# Patient Record
Sex: FEMALE | Race: WHITE | URBAN - METROPOLITAN AREA
[De-identification: names, ages, dates, MRNs, and addresses within clinical notes are randomized per-mention and may not be internally consistent; named-entity substitution may affect disease eponyms.]

---

## 2020-06-18 ENCOUNTER — OFFICE VISIT (OUTPATIENT)
Dept: URBAN - METROPOLITAN AREA CLINIC 22 | Facility: CLINIC | Age: 50
End: 2020-06-18
Payer: COMMERCIAL

## 2020-06-18 DIAGNOSIS — H52.13 MYOPIA, BILATERAL: Primary | ICD-10-CM

## 2020-06-18 DIAGNOSIS — H52.223 REGULAR ASTIGMATISM, BILATERAL: ICD-10-CM

## 2020-06-18 PROCEDURE — 92004 COMPRE OPH EXAM NEW PT 1/>: CPT | Performed by: OPTOMETRIST

## 2020-06-18 PROCEDURE — 92015 DETERMINE REFRACTIVE STATE: CPT | Performed by: OPTOMETRIST

## 2020-06-18 ASSESSMENT — VISUAL ACUITY
OD: 20/20
OS: 20/20

## 2020-06-18 ASSESSMENT — INTRAOCULAR PRESSURE
OD: 16
OS: 18

## 2020-06-18 NOTE — IMPRESSION/PLAN
Impression: Regular astigmatism, bilateral: H52.223. OU. Plan: Rx new prescription, fill as directed.

## 2022-12-30 ENCOUNTER — OFFICE VISIT (OUTPATIENT)
Dept: URBAN - METROPOLITAN AREA CLINIC 22 | Facility: CLINIC | Age: 52
End: 2022-12-30
Payer: COMMERCIAL

## 2022-12-30 DIAGNOSIS — H52.223 REGULAR ASTIGMATISM, BILATERAL: Primary | ICD-10-CM

## 2022-12-30 PROCEDURE — 92014 COMPRE OPH EXAM EST PT 1/>: CPT | Performed by: STUDENT IN AN ORGANIZED HEALTH CARE EDUCATION/TRAINING PROGRAM

## 2022-12-30 ASSESSMENT — VISUAL ACUITY
OS: 20/20
OD: 20/20

## 2022-12-30 ASSESSMENT — INTRAOCULAR PRESSURE
OS: 20
OD: 15

## 2024-02-27 ENCOUNTER — OFFICE VISIT (OUTPATIENT)
Dept: URBAN - METROPOLITAN AREA CLINIC 22 | Facility: CLINIC | Age: 54
End: 2024-02-27
Payer: COMMERCIAL

## 2024-02-27 DIAGNOSIS — H52.223 REGULAR ASTIGMATISM, BILATERAL: Primary | ICD-10-CM

## 2024-02-27 PROCEDURE — 92014 COMPRE OPH EXAM EST PT 1/>: CPT | Performed by: STUDENT IN AN ORGANIZED HEALTH CARE EDUCATION/TRAINING PROGRAM

## 2024-02-27 ASSESSMENT — INTRAOCULAR PRESSURE
OD: 15
OS: 15

## 2025-04-25 ENCOUNTER — OFFICE VISIT (OUTPATIENT)
Dept: URBAN - METROPOLITAN AREA CLINIC 22 | Facility: CLINIC | Age: 55
End: 2025-04-25
Payer: COMMERCIAL

## 2025-04-25 DIAGNOSIS — H52.223 REGULAR ASTIGMATISM, BILATERAL: Primary | ICD-10-CM

## 2025-04-25 PROCEDURE — 92014 COMPRE OPH EXAM EST PT 1/>: CPT | Performed by: STUDENT IN AN ORGANIZED HEALTH CARE EDUCATION/TRAINING PROGRAM

## 2025-04-25 ASSESSMENT — VISUAL ACUITY
OD: 20/20
OS: 20/20

## 2025-04-25 ASSESSMENT — INTRAOCULAR PRESSURE
OD: 15
OS: 17